# Patient Record
Sex: FEMALE | Race: OTHER | Employment: PART TIME | URBAN - METROPOLITAN AREA
[De-identification: names, ages, dates, MRNs, and addresses within clinical notes are randomized per-mention and may not be internally consistent; named-entity substitution may affect disease eponyms.]

---

## 2017-06-09 ENCOUNTER — OFFICE VISIT (OUTPATIENT)
Dept: INTERNAL MEDICINE CLINIC | Facility: CLINIC | Age: 22
End: 2017-06-09

## 2017-06-09 ENCOUNTER — APPOINTMENT (OUTPATIENT)
Dept: LAB | Age: 22
End: 2017-06-09
Attending: INTERNAL MEDICINE
Payer: COMMERCIAL

## 2017-06-09 VITALS
HEART RATE: 79 BPM | WEIGHT: 282 LBS | DIASTOLIC BLOOD PRESSURE: 87 MMHG | SYSTOLIC BLOOD PRESSURE: 131 MMHG | HEIGHT: 66 IN | BODY MASS INDEX: 45.32 KG/M2

## 2017-06-09 DIAGNOSIS — Z28.3 INCOMPLETE IMMUNIZATION STATUS: ICD-10-CM

## 2017-06-09 DIAGNOSIS — Z11.1 SCREENING-PULMONARY TB: ICD-10-CM

## 2017-06-09 DIAGNOSIS — Z00.00 ANNUAL PHYSICAL EXAM: Primary | ICD-10-CM

## 2017-06-09 PROCEDURE — 86762 RUBELLA ANTIBODY: CPT

## 2017-06-09 PROCEDURE — 87340 HEPATITIS B SURFACE AG IA: CPT

## 2017-06-09 PROCEDURE — 86765 RUBEOLA ANTIBODY: CPT

## 2017-06-09 PROCEDURE — 86480 TB TEST CELL IMMUN MEASURE: CPT

## 2017-06-09 PROCEDURE — 86706 HEP B SURFACE ANTIBODY: CPT

## 2017-06-09 PROCEDURE — 36415 COLL VENOUS BLD VENIPUNCTURE: CPT

## 2017-06-09 PROCEDURE — 99395 PREV VISIT EST AGE 18-39: CPT | Performed by: INTERNAL MEDICINE

## 2017-06-09 PROCEDURE — 86787 VARICELLA-ZOSTER ANTIBODY: CPT

## 2017-06-09 NOTE — PROGRESS NOTES
HPI:    Patient ID: Sarthak Montano is a 25year old female. HPI     Annual Preventative Exam  Pt arrives today for annual preventative physical examination. The patient complains of no new problems and has 0/10 pain. No change in FHx.  Pt sts lipid panel no mass. There is no tenderness. There is no rebound and no guarding. Musculoskeletal: Normal range of motion. Lymphadenopathy:     She has no cervical adenopathy. Neurological: She is alert and oriented to person, place, and time.    Skin: Skin is dr

## 2017-06-28 ENCOUNTER — TELEPHONE (OUTPATIENT)
Dept: INTERNAL MEDICINE CLINIC | Facility: CLINIC | Age: 22
End: 2017-06-28

## 2017-06-28 NOTE — TELEPHONE ENCOUNTER
Pt is calling to inquire about her results. Pt states she had them done about 3 weeks ago. Pt is asking for a call back today.  Please advise

## 2017-06-29 NOTE — TELEPHONE ENCOUNTER
Recent QuantiFERON for tuberculosis was negative. Appetite is B surface antigen was nonreactive. Varicella-zoster titers are 684 which is immune.   (Immunity is above 165.)  Rubella IgG was high at 35.7 and immunity is about 15 which means the patient is

## 2017-06-30 NOTE — TELEPHONE ENCOUNTER
Patient informed of results, and given MD's recommendations. Patient verbalized understanding with intent to comply. Questions answered. Patient would like to  a copy of results.  Will route to clinical staff to print and contact patient when read

## 2017-07-25 ENCOUNTER — TELEPHONE (OUTPATIENT)
Dept: INTERNAL MEDICINE CLINIC | Facility: CLINIC | Age: 22
End: 2017-07-25

## 2017-07-25 NOTE — TELEPHONE ENCOUNTER
PATIENT NEED COPY OF THE RESULTS OF THE QuantiFERON for tuberculosis AND VARICELLA      IN [place]   NEEDS FOR SCHOOL ASAP

## 2017-07-25 NOTE — TELEPHONE ENCOUNTER
Spoke with patient advised her Ghulam Soto will be in [facility]. Patient is ok with this and will  tomorrow.

## 2018-03-07 ENCOUNTER — OFFICE VISIT (OUTPATIENT)
Dept: INTERNAL MEDICINE CLINIC | Facility: CLINIC | Age: 23
End: 2018-03-07

## 2018-03-07 ENCOUNTER — APPOINTMENT (OUTPATIENT)
Dept: LAB | Facility: HOSPITAL | Age: 23
End: 2018-03-07
Attending: INTERNAL MEDICINE
Payer: COMMERCIAL

## 2018-03-07 VITALS
HEIGHT: 66 IN | TEMPERATURE: 99 F | HEART RATE: 78 BPM | BODY MASS INDEX: 45.64 KG/M2 | WEIGHT: 284 LBS | SYSTOLIC BLOOD PRESSURE: 128 MMHG | DIASTOLIC BLOOD PRESSURE: 82 MMHG | RESPIRATION RATE: 18 BRPM

## 2018-03-07 DIAGNOSIS — Z02.1 PRE-EMPLOYMENT HEALTH SCREENING EXAMINATION: ICD-10-CM

## 2018-03-07 DIAGNOSIS — Z00.00 ROUTINE MEDICAL EXAM: Primary | ICD-10-CM

## 2018-03-07 PROBLEM — E66.01 OBESITY, CLASS III, BMI 40-49.9 (MORBID OBESITY) (HCC): Status: ACTIVE | Noted: 2018-03-07

## 2018-03-07 PROCEDURE — 36415 COLL VENOUS BLD VENIPUNCTURE: CPT

## 2018-03-07 PROCEDURE — 99213 OFFICE O/P EST LOW 20 MIN: CPT | Performed by: INTERNAL MEDICINE

## 2018-03-07 PROCEDURE — 99212 OFFICE O/P EST SF 10 MIN: CPT | Performed by: INTERNAL MEDICINE

## 2018-03-07 PROCEDURE — 86480 TB TEST CELL IMMUN MEASURE: CPT

## 2018-03-07 NOTE — PROGRESS NOTES
HPI:    Patient ID: Daniel Wong is a 25year old female. HPI she came in today to establish care with new physician. She also needs physical for her school and TB testing. Denies any complaints. She is trying to watch her diet.   But not exercising Onset   • Hypertension Father    • Other [OTHER] Mother      anemia      Social History: Smoking status: Never Smoker                                                              Alcohol use:  Yes              Comment: Socially       PHYSICAL EXAM:    Physi rigidity, no rebound, no guarding and no CVA tenderness. Musculoskeletal: Normal range of motion. She exhibits no edema or tenderness. Lymphadenopathy:     She has no cervical adenopathy. She has no axillary adenopathy.    Neurological: She is alert

## 2018-03-07 NOTE — PATIENT INSTRUCTIONS
outine medical exam  (primary encounter diagnosis) we will order QuantiFERON gold     Morbid obesity -advised for diet and aerobic exercise 4 times a week for 45 minutes ,advised to drink more  fluids , at least 64 ounces a day no soda  discussed about ref

## 2018-03-09 LAB
M TB IFN-G CD4+ BCKGRND COR BLD-ACNC: -0.01 IU/ML
M TB IFN-G CD4+ T-CELLS BLD-ACNC: 0.08 IU/ML
M TB TUBERC IFN-G BLD QL: NEGATIVE
M TB TUBERC IGNF/MITOGEN IGNF CONTROL: 9.41 IU/ML

## 2018-07-13 ENCOUNTER — OFFICE VISIT (OUTPATIENT)
Dept: INTERNAL MEDICINE CLINIC | Facility: CLINIC | Age: 23
End: 2018-07-13

## 2018-07-13 VITALS
TEMPERATURE: 99 F | SYSTOLIC BLOOD PRESSURE: 112 MMHG | HEART RATE: 78 BPM | WEIGHT: 215 LBS | RESPIRATION RATE: 18 BRPM | HEIGHT: 66 IN | BODY MASS INDEX: 34.55 KG/M2 | DIASTOLIC BLOOD PRESSURE: 75 MMHG

## 2018-07-13 DIAGNOSIS — H60.8X1 OTHER OTITIS EXTERNA, RIGHT EAR: Primary | ICD-10-CM

## 2018-07-13 DIAGNOSIS — H92.09 EAR ACHE: ICD-10-CM

## 2018-07-13 PROCEDURE — 99213 OFFICE O/P EST LOW 20 MIN: CPT | Performed by: INTERNAL MEDICINE

## 2018-07-13 PROCEDURE — 99212 OFFICE O/P EST SF 10 MIN: CPT | Performed by: INTERNAL MEDICINE

## 2018-07-13 RX ORDER — OFLOXACIN 3 MG/ML
5 SOLUTION AURICULAR (OTIC) 2 TIMES DAILY
Qty: 1 BOTTLE | Refills: 0 | Status: SHIPPED | OUTPATIENT
Start: 2018-07-13 | End: 2018-07-20

## 2018-07-13 NOTE — PROGRESS NOTES
HPI:    Patient ID: Gin Banegas is a 21year old female. HPI she came in today complaing of right ear pain  Ongoing ofr 2 days.  According to her  She did swim on [address]  On July 4 th , since then she did have fullness in her ear , but for the l in ear(s) 2 (two) times daily. Disp: 1 Bottle Rfl: 0     Allergies:No Known Allergies    HISTORY:  Past Medical History:   Diagnosis Date   • Anemia       No past surgical history on file.    Family History   Problem Relation Age of Onset   • Hypertension F rales. She exhibits no tenderness. Abdominal: Soft. Bowel sounds are normal. She exhibits no shifting dullness, no distension and no mass. There is no hepatosplenomegaly. There is no tenderness.  There is no rigidity, no rebound, no guarding and no CVA te

## 2020-07-16 ENCOUNTER — TELEPHONE (OUTPATIENT)
Dept: INTERNAL MEDICINE CLINIC | Facility: CLINIC | Age: 25
End: 2020-07-16

## 2020-10-30 ENCOUNTER — TELEPHONE (OUTPATIENT)
Dept: INTERNAL MEDICINE CLINIC | Facility: CLINIC | Age: 25
End: 2020-10-30

## 2020-10-30 NOTE — TELEPHONE ENCOUNTER
Called and left message for patient to give us a call and schedule an appointment with Dr. Lamberto Luna or confirm if she is no longer being seen by this office.

## 2020-12-03 ENCOUNTER — TELEPHONE (OUTPATIENT)
Dept: INTERNAL MEDICINE CLINIC | Facility: CLINIC | Age: 25
End: 2020-12-03

## 2020-12-03 NOTE — TELEPHONE ENCOUNTER
Not seen her since 2018 please call patient and check if she has different PCP if not she is due for physical and Pap smear.

## 2020-12-04 NOTE — TELEPHONE ENCOUNTER
Left message for pt to call back and let us know if she has new pcp, otherwise pt is due for physical and pap.

## 2021-01-19 ENCOUNTER — TELEPHONE (OUTPATIENT)
Dept: INTERNAL MEDICINE CLINIC | Facility: CLINIC | Age: 26
End: 2021-01-19

## 2021-01-19 NOTE — TELEPHONE ENCOUNTER
Patient has an up coming appointment she is asking if you can order blood test prior to the visit   Annual Physical      Comments:   I would like to get blood work done too, if possible prior to appointment, CBC/CMP/ lipid panel and hgbA1c.  Also, if you co

## 2021-02-10 ENCOUNTER — LAB ENCOUNTER (OUTPATIENT)
Dept: LAB | Facility: HOSPITAL | Age: 26
End: 2021-02-10
Attending: INTERNAL MEDICINE
Payer: COMMERCIAL

## 2021-02-10 ENCOUNTER — OFFICE VISIT (OUTPATIENT)
Dept: INTERNAL MEDICINE CLINIC | Facility: CLINIC | Age: 26
End: 2021-02-10
Payer: COMMERCIAL

## 2021-02-10 VITALS
SYSTOLIC BLOOD PRESSURE: 138 MMHG | BODY MASS INDEX: 47.09 KG/M2 | DIASTOLIC BLOOD PRESSURE: 78 MMHG | HEIGHT: 66 IN | HEART RATE: 79 BPM | WEIGHT: 293 LBS

## 2021-02-10 DIAGNOSIS — Z00.00 ANNUAL PHYSICAL EXAM: Primary | ICD-10-CM

## 2021-02-10 PROCEDURE — 36415 COLL VENOUS BLD VENIPUNCTURE: CPT | Performed by: INTERNAL MEDICINE

## 2021-02-10 PROCEDURE — 80053 COMPREHEN METABOLIC PANEL: CPT | Performed by: INTERNAL MEDICINE

## 2021-02-10 PROCEDURE — 80061 LIPID PANEL: CPT | Performed by: INTERNAL MEDICINE

## 2021-02-10 PROCEDURE — 3075F SYST BP GE 130 - 139MM HG: CPT | Performed by: INTERNAL MEDICINE

## 2021-02-10 PROCEDURE — 99395 PREV VISIT EST AGE 18-39: CPT | Performed by: INTERNAL MEDICINE

## 2021-02-10 PROCEDURE — 3008F BODY MASS INDEX DOCD: CPT | Performed by: INTERNAL MEDICINE

## 2021-02-10 PROCEDURE — 84443 ASSAY THYROID STIM HORMONE: CPT | Performed by: INTERNAL MEDICINE

## 2021-02-10 PROCEDURE — 85025 COMPLETE CBC W/AUTO DIFF WBC: CPT | Performed by: INTERNAL MEDICINE

## 2021-02-10 PROCEDURE — 83036 HEMOGLOBIN GLYCOSYLATED A1C: CPT | Performed by: INTERNAL MEDICINE

## 2021-02-10 PROCEDURE — 3078F DIAST BP <80 MM HG: CPT | Performed by: INTERNAL MEDICINE

## 2021-02-10 NOTE — PROGRESS NOTES
HPI:   Greg Damon is a 22year old female who presents for a complete physical exam. Her period last 5 days     Wt Readings from Last 3 Encounters:  02/10/21 : (!) 317 lb (143.8 kg)  07/13/18 : 215 lb (97.5 kg)  03/07/18 : 284 lb (128.8 kg)    Body mass tremors. Psych Negative Anxiety, depression and insomnia. Integumentary Negative Breast discharge, breast lump(s), hair loss and rash. MS Negative Back pain and joint pain. Hema/Lymph Negative Easy bleeding, easy bruising and thromboembolic events. Burma Collet is a 22year old female who presents for a complete physical exam. Pap and pelvic done. Self breast exam explained.    Health maintenance: patient  Blood work reviewed   Morbid obesity -  Advised her to watch diet  And aerobic exercise 4 david

## 2021-11-08 ENCOUNTER — OFFICE VISIT (OUTPATIENT)
Dept: INTERNAL MEDICINE CLINIC | Facility: CLINIC | Age: 26
End: 2021-11-08
Payer: COMMERCIAL

## 2021-11-08 ENCOUNTER — MED REC SCAN ONLY (OUTPATIENT)
Dept: INTERNAL MEDICINE CLINIC | Facility: CLINIC | Age: 26
End: 2021-11-08

## 2021-11-08 VITALS
BODY MASS INDEX: 47.09 KG/M2 | WEIGHT: 293 LBS | HEART RATE: 90 BPM | DIASTOLIC BLOOD PRESSURE: 80 MMHG | OXYGEN SATURATION: 97 % | SYSTOLIC BLOOD PRESSURE: 135 MMHG | HEIGHT: 66 IN | TEMPERATURE: 98 F

## 2021-11-08 DIAGNOSIS — Z23 NEED FOR VACCINATION: ICD-10-CM

## 2021-11-08 DIAGNOSIS — Z32.00 POSSIBLE PREGNANCY: ICD-10-CM

## 2021-11-08 DIAGNOSIS — Z12.4 SCREENING FOR CERVICAL CANCER: ICD-10-CM

## 2021-11-08 DIAGNOSIS — D50.8 OTHER IRON DEFICIENCY ANEMIA: Primary | ICD-10-CM

## 2021-11-08 PROCEDURE — 90686 IIV4 VACC NO PRSV 0.5 ML IM: CPT | Performed by: INTERNAL MEDICINE

## 2021-11-08 PROCEDURE — 3008F BODY MASS INDEX DOCD: CPT | Performed by: INTERNAL MEDICINE

## 2021-11-08 PROCEDURE — 3079F DIAST BP 80-89 MM HG: CPT | Performed by: INTERNAL MEDICINE

## 2021-11-08 PROCEDURE — 90471 IMMUNIZATION ADMIN: CPT | Performed by: INTERNAL MEDICINE

## 2021-11-08 PROCEDURE — 99213 OFFICE O/P EST LOW 20 MIN: CPT | Performed by: INTERNAL MEDICINE

## 2021-11-08 PROCEDURE — 3075F SYST BP GE 130 - 139MM HG: CPT | Performed by: INTERNAL MEDICINE

## 2021-11-08 PROCEDURE — 81025 URINE PREGNANCY TEST: CPT | Performed by: INTERNAL MEDICINE

## 2021-11-08 NOTE — PROGRESS NOTES
Subjective:     Patient ID: Indu Marvin is a 32year old female. HPI   She came in today for Pap smear, missed menstrual.,  Anemia. She states that her menstrual period is late she did pregnancy test was negative she wants to repeat test today. Status: She is alert and oriented to person, place, and time. Mental status is at baseline.          Assessment & Plan:   Other iron deficiency anemia  (primary encounter diagnosis) check  Cbc , iron panel / ferritin  Missed period / check  Urine pregnancy

## 2021-11-30 ENCOUNTER — TELEPHONE (OUTPATIENT)
Dept: INTERNAL MEDICINE CLINIC | Facility: CLINIC | Age: 26
End: 2021-11-30

## 2021-11-30 NOTE — TELEPHONE ENCOUNTER
Patient is requesting a note that shows info of flu shot with name and lot number. So she can send to her job this week. It needs to show her name along with all the info. Please send to her mychart, call with any questions.

## 2021-12-30 ENCOUNTER — MED REC SCAN ONLY (OUTPATIENT)
Dept: INTERNAL MEDICINE CLINIC | Facility: CLINIC | Age: 26
End: 2021-12-30

## (undated) NOTE — MR AVS SNAPSHOT
After Visit Summary   11/8/2021    Shanti Red   MRN: [id number]           Visit Information     Date & Time  11/8/2021 10:30 AM Provider  Adan Leyva MD [address] [place] [address], [address] [place] [address],[address], [facility].  Phone  [phone]      Your V We strive to deliver the best patient experience and are looking for ways to make improvements. Your feedback will help us do so. For more information on [employer], please visit [email or web address]         No text in SmartText

## (undated) NOTE — MR AVS SNAPSHOT
[address]   [address]  [id number] [phone]               Thank you for choosing us for your health care visit with Lord Servando MD.  We are glad to serve you and happy to provide you with this summary of yo Sign up for Weekend-a-gogot, your secure online medical record. YouScan will allow you to access patient instructions from your recent visit,  view other health information, and more. To sign up or find more information, go to [email or web address] and cl increments are effective and add up over the week   2 ½ hours per week – spread out over time Use a stacy to keep you motivated   Don’t forget strength training with weights and resistance Set goals and track your progress   You don’t need to join a gym.